# Patient Record
Sex: MALE | Race: WHITE | Employment: OTHER | URBAN - METROPOLITAN AREA
[De-identification: names, ages, dates, MRNs, and addresses within clinical notes are randomized per-mention and may not be internally consistent; named-entity substitution may affect disease eponyms.]

---

## 2017-07-31 ENCOUNTER — HOSPITAL ENCOUNTER (OUTPATIENT)
Facility: CLINIC | Age: 52
Discharge: HOME | End: 2017-07-31
Attending: SURGERY | Admitting: SURGERY
Payer: COMMERCIAL

## 2017-07-31 ENCOUNTER — SURGERY (OUTPATIENT)
Age: 52
End: 2017-07-31

## 2017-07-31 ENCOUNTER — ANESTHESIA EVENT (OUTPATIENT)
Dept: GASTROENTEROLOGY | Facility: CLINIC | Age: 52
End: 2017-07-31
Payer: COMMERCIAL

## 2017-07-31 ENCOUNTER — ANESTHESIA (OUTPATIENT)
Dept: GASTROENTEROLOGY | Facility: CLINIC | Age: 52
End: 2017-07-31
Payer: COMMERCIAL

## 2017-07-31 VITALS
DIASTOLIC BLOOD PRESSURE: 100 MMHG | BODY MASS INDEX: 30.92 KG/M2 | HEIGHT: 68 IN | SYSTOLIC BLOOD PRESSURE: 136 MMHG | TEMPERATURE: 97.4 F | HEART RATE: 80 BPM | OXYGEN SATURATION: 97 % | RESPIRATION RATE: 16 BRPM | WEIGHT: 204 LBS

## 2017-07-31 LAB — UPPER GI ENDOSCOPY: NORMAL

## 2017-07-31 PROCEDURE — 25000125 ZZHC RX 250: Performed by: NURSE ANESTHETIST, CERTIFIED REGISTERED

## 2017-07-31 PROCEDURE — 25000125 ZZHC RX 250: Performed by: SURGERY

## 2017-07-31 PROCEDURE — 37000008 ZZH ANESTHESIA TECHNICAL FEE, 1ST 30 MIN: Performed by: SURGERY

## 2017-07-31 PROCEDURE — 25000125 ZZHC RX 250

## 2017-07-31 PROCEDURE — 43239 EGD BIOPSY SINGLE/MULTIPLE: CPT | Performed by: SURGERY

## 2017-07-31 PROCEDURE — 25000128 H RX IP 250 OP 636: Performed by: SURGERY

## 2017-07-31 PROCEDURE — 88305 TISSUE EXAM BY PATHOLOGIST: CPT | Performed by: SURGERY

## 2017-07-31 PROCEDURE — 88305 TISSUE EXAM BY PATHOLOGIST: CPT | Mod: 26,59 | Performed by: SURGERY

## 2017-07-31 RX ORDER — LIDOCAINE 40 MG/G
CREAM TOPICAL
Status: DISCONTINUED | OUTPATIENT
Start: 2017-07-31 | End: 2017-07-31 | Stop reason: HOSPADM

## 2017-07-31 RX ORDER — ONDANSETRON 2 MG/ML
4 INJECTION INTRAMUSCULAR; INTRAVENOUS
Status: DISCONTINUED | OUTPATIENT
Start: 2017-07-31 | End: 2017-07-31 | Stop reason: HOSPADM

## 2017-07-31 RX ORDER — GLYCOPYRROLATE 0.2 MG/ML
INJECTION, SOLUTION INTRAMUSCULAR; INTRAVENOUS PRN
Status: DISCONTINUED | OUTPATIENT
Start: 2017-07-31 | End: 2017-07-31

## 2017-07-31 RX ORDER — PROPOFOL 10 MG/ML
INJECTION, EMULSION INTRAVENOUS PRN
Status: DISCONTINUED | OUTPATIENT
Start: 2017-07-31 | End: 2017-07-31

## 2017-07-31 RX ORDER — SODIUM CHLORIDE, SODIUM LACTATE, POTASSIUM CHLORIDE, CALCIUM CHLORIDE 600; 310; 30; 20 MG/100ML; MG/100ML; MG/100ML; MG/100ML
INJECTION, SOLUTION INTRAVENOUS CONTINUOUS
Status: DISCONTINUED | OUTPATIENT
Start: 2017-07-31 | End: 2017-07-31 | Stop reason: HOSPADM

## 2017-07-31 RX ORDER — LIDOCAINE HYDROCHLORIDE 10 MG/ML
INJECTION, SOLUTION INFILTRATION; PERINEURAL PRN
Status: DISCONTINUED | OUTPATIENT
Start: 2017-07-31 | End: 2017-07-31

## 2017-07-31 RX ORDER — PROPOFOL 10 MG/ML
INJECTION, EMULSION INTRAVENOUS CONTINUOUS PRN
Status: DISCONTINUED | OUTPATIENT
Start: 2017-07-31 | End: 2017-07-31

## 2017-07-31 RX ADMIN — GLYCOPYRROLATE 0.2 MG: 0.2 INJECTION, SOLUTION INTRAMUSCULAR; INTRAVENOUS at 12:34

## 2017-07-31 RX ADMIN — LIDOCAINE HYDROCHLORIDE 50 MG: 10 INJECTION, SOLUTION INFILTRATION; PERINEURAL at 12:34

## 2017-07-31 RX ADMIN — LIDOCAINE HYDROCHLORIDE 1 ML: 10 INJECTION, SOLUTION EPIDURAL; INFILTRATION; INTRACAUDAL; PERINEURAL at 12:16

## 2017-07-31 RX ADMIN — PROPOFOL 200 MCG/KG/MIN: 10 INJECTION, EMULSION INTRAVENOUS at 12:34

## 2017-07-31 RX ADMIN — PROPOFOL 100 MG: 10 INJECTION, EMULSION INTRAVENOUS at 12:34

## 2017-07-31 RX ADMIN — SODIUM CHLORIDE, POTASSIUM CHLORIDE, SODIUM LACTATE AND CALCIUM CHLORIDE: 600; 310; 30; 20 INJECTION, SOLUTION INTRAVENOUS at 12:16

## 2017-07-31 ASSESSMENT — LIFESTYLE VARIABLES: TOBACCO_USE: 1

## 2017-07-31 NOTE — H&P
"[facility]  GI Pre-Procedure History & Physical      Name: Darryl Guo MRN#: [id number]   Age: 52 year old YOB: 1965     Date of Procedure: 7/31/2017    Primary care provider: Lolis Reeves Stambaugh      Reason for Procedure:   Screening (V76.51), abdominal pain    Problem List:   See a copy of the patient s current problem list from [facility].  I have reviewed this document and have no additions or corrections.    Current Outpatient Medications:      No current outpatient prescriptions on file.        Allergies:    No Known Allergies     History:   See a copy of the patient s current past history from [facility].  I have reviewed this document and have no additions or corrections.    Physical Exam:   Vital Signs:  BP (!) 129/91  Pulse 76  Temp 97.4  F (36.3  C) (Oral)  Resp 18  Ht 1.727 m (5' 8\")  Wt 92.5 kg (204 lb)  SpO2 97%  BMI 31.02 kg/m2  Airway assessment:  Patient is able to open mouth wide  Patient is able to stick out tongue    ASA:  2  Mallampati Score: 2     Lungs:  No increased work of breathing, good air exchange, clear to auscultation bilaterally, no crackles or wheezing.   Cardiovascular:  Normal- regular rate and rhythm, normal S1 - S2, no S3 or S4, and no murmur noted.  Gastrointestinal:  Abdomen soft, non-tender.  BS normal. No masses, organomegaly.        Assessment:   Appropriately NPO.  No significant changes since H&P.    Plan:   Moderate (conscious) sedation     General and specific risks of the procedure of the procedure including pain, bleeding, and perforation were discussed. Possibility of missing lesions discussed. Prep and recovery were discussed. He asked appropriate questions and provided consent.      Patient's active problems diagnostically and therapeutically optimized for the planned procedure. Risks, benefits, alternatives to sedation and blood explained and consent obtained.  Risks, benefits, alternatives to procedure explained and consent " obtained.    I have reviewed the history and physical, lab finding(s), diagnostic data, medications, and the plan for sedation.  I have determined this patient to be an appropriate candidate for the planned sedation/procedure and have reassessed the patient immediately prior to sedation/procedure.    Robin Mckeon MD

## 2017-07-31 NOTE — ANESTHESIA CARE TRANSFER NOTE
Patient: Darryl Guo    Procedure(s):  Gastroscopy with biopsies - Wound Class: II-Clean Contaminated    Diagnosis: reflux  Diagnosis Additional Information: No value filed.    Anesthesia Type:   General, MAC     Note:  Airway :Nasal Cannula  Patient transferred to:Phase II        Vitals: (Last set prior to Anesthesia Care Transfer)    CRNA VITALS  7/31/2017 1223 - 7/31/2017 1300      7/31/2017             Pulse: 98    SpO2: 97 %                Electronically Signed By: MANI Orlando CRNA  July 31, 2017  1:00 PM

## 2017-07-31 NOTE — ANESTHESIA PREPROCEDURE EVALUATION
Anesthesia Evaluation     . Pt has had prior anesthetic.     No history of anesthetic complications          ROS/MED HX    ENT/Pulmonary:     (+)tobacco use, Past use , . .    Neurologic:  - neg neurologic ROS     Cardiovascular:  - neg cardiovascular ROS       METS/Exercise Tolerance:  >4 METS   Hematologic:  - neg hematologic  ROS       Musculoskeletal:  - neg musculoskeletal ROS       GI/Hepatic: Comment: Increased risk of aspiration due to GERD    (+) GERD Symptomatic, Other GI/Hepatic epigastric pain      Renal/Genitourinary:  - ROS Renal section negative       Endo:     (+) Obesity, .      Psychiatric:  - neg psychiatric ROS       Infectious Disease:  - neg infectious disease ROS       Malignancy:      - no malignancy   Other:    - neg other ROS                 Physical Exam  Normal systems: cardiovascular, pulmonary and dental    Airway   Mallampati: II  TM distance: >3 FB  Neck ROM: full    Dental     Cardiovascular       Pulmonary                     Anesthesia Plan      History & Physical Review  History and physical reviewed and following examination; no interval change.    ASA Status:  2 .    NPO Status:  > 8 hours    Plan for General and MAC with Propofol and Intravenous induction. Maintenance will be Balanced.  Reason for MAC:  Deep or markedly invasive procedure (G8)  PONV prophylaxis:  Ondansetron (or other 5HT-3) and Dexamethasone or Solumedrol       Postoperative Care  Postoperative pain management:  IV analgesics and Oral pain medications.      Consents  Anesthetic plan, risks, benefits and alternatives discussed with:  Patient..                          .

## 2017-07-31 NOTE — ANESTHESIA POSTPROCEDURE EVALUATION
Patient: Darryl Guo    Procedure(s):  Gastroscopy with biopsies - Wound Class: II-Clean Contaminated    Diagnosis:reflux  Diagnosis Additional Information: No value filed.    Anesthesia Type:  General, MAC    Note:  Anesthesia Post Evaluation    Patient location during evaluation: Bedside  Patient participation: Able to fully participate in evaluation  Post-procedure mental status: sleepy.  Pain management: adequate  Airway patency: patent  Cardiovascular status: stable  Respiratory status: nasal cannula  Hydration status: stable  PONV: none     Anesthetic complications: None          Last vitals:  Vitals:    07/31/17 1150 07/31/17 1255   BP: (!) 129/91 (!) 123/94   Pulse: 76 92   Resp: 18 16   Temp: 36.3  C (97.4  F)    SpO2: 97% 98%         Electronically Signed By: MANI Orlando CRNA  July 31, 2017  1:00 PM

## 2017-07-31 NOTE — PROGRESS NOTES
Pt.  and wife verbally stated understanding of all instructions and importance of follow up care. Pt. tolerating P.o w/o difficulty. Ambulated out to lobby w/o difficulty.

## 2017-08-02 LAB — COPATH REPORT: NORMAL

## 2017-10-16 ENCOUNTER — HOSPITAL ENCOUNTER (EMERGENCY)
Facility: CLINIC | Age: 52
Discharge: HOME | End: 2017-10-16
Attending: PHYSICIAN ASSISTANT | Admitting: PHYSICIAN ASSISTANT
Payer: COMMERCIAL

## 2017-10-16 VITALS — OXYGEN SATURATION: 99 % | HEART RATE: 92 BPM | DIASTOLIC BLOOD PRESSURE: 84 MMHG | SYSTOLIC BLOOD PRESSURE: 129 MMHG

## 2017-10-16 DIAGNOSIS — S61.009A: ICD-10-CM

## 2017-10-16 PROCEDURE — 96372 THER/PROPH/DIAG INJ SC/IM: CPT

## 2017-10-16 PROCEDURE — 90715 TDAP VACCINE 7 YRS/> IM: CPT | Performed by: PHYSICIAN ASSISTANT

## 2017-10-16 PROCEDURE — 12042 INTMD RPR N-HF/GENIT2.6-7.5: CPT | Mod: FA

## 2017-10-16 PROCEDURE — 99214 OFFICE O/P EST MOD 30 MIN: CPT | Mod: 25

## 2017-10-16 PROCEDURE — 25000128 H RX IP 250 OP 636: Performed by: PHYSICIAN ASSISTANT

## 2017-10-16 PROCEDURE — 12041 INTMD RPR N-HF/GENIT 2.5CM/<: CPT | Mod: FA | Performed by: PHYSICIAN ASSISTANT

## 2017-10-16 PROCEDURE — 99213 OFFICE O/P EST LOW 20 MIN: CPT | Mod: 25 | Performed by: PHYSICIAN ASSISTANT

## 2017-10-16 PROCEDURE — 90471 IMMUNIZATION ADMIN: CPT

## 2017-10-16 PROCEDURE — 12041 INTMD RPR N-HF/GENIT 2.5CM/<: CPT | Mod: FA

## 2017-10-16 RX ORDER — KETOROLAC TROMETHAMINE 30 MG/ML
30 INJECTION, SOLUTION INTRAMUSCULAR; INTRAVENOUS ONCE
Status: COMPLETED | OUTPATIENT
Start: 2017-10-16 | End: 2017-10-16

## 2017-10-16 RX ADMIN — CLOSTRIDIUM TETANI TOXOID ANTIGEN (FORMALDEHYDE INACTIVATED), CORYNEBACTERIUM DIPHTHERIAE TOXOID ANTIGEN (FORMALDEHYDE INACTIVATED), BORDETELLA PERTUSSIS TOXOID ANTIGEN (GLUTARALDEHYDE INACTIVATED), BORDETELLA PERTUSSIS FILAMENTOUS HEMAGGLUTININ ANTIGEN (FORMALDEHYDE INACTIVATED), BORDETELLA PERTUSSIS PERTACTIN ANTIGEN, AND BORDETELLA PERTUSSIS FIMBRIAE 2/3 ANTIGEN 0.5 ML: 5; 2; 2.5; 5; 3; 5 INJECTION, SUSPENSION INTRAMUSCULAR at 16:29

## 2017-10-16 RX ADMIN — KETOROLAC TROMETHAMINE 30 MG: 30 INJECTION, SOLUTION INTRAMUSCULAR at 16:28

## 2017-10-16 NOTE — ED AVS SNAPSHOT
[facility] Emergency Department    [address] [address]    Phone:  [phone]    Fax:  [phone]                                       Darryl Guo   MRN: [id number]    Department:  [facility] Emergency Department   Date of Visit:  10/16/2017           Patient Information     Date Of Birth          1965        Your diagnoses for this visit were:     Avulsion of skin of thumb without complication, initial encounter        You were seen by Zoey Nuñez PA-C.      Follow-up Information     Follow up with Clinic, [facility] In 2 days.    Contact information:    [facility] [facility] [id number] [phone]          Discharge Instructions       After care instructions:  Keep wound clean and dry for the next 24-48 hours  Signs of infection discussed today  May return to work as long as wound is kept clean and dry  Discussed the probability of scarring  Tylenol/ibuprofen over the counter as needed for pain.     Tetanus given in office today     Follow up with PCP or return to care in 2-3 days.    Return to clinic sooner or go to Emergency Room if symptoms worsen or change or signs of infection occur (redness, red streaking, warmth, increased pain, increased swelling, purulent drainage, or fevers occur.)    Patient voiced understanding of instructions given.            24 Hour Appointment Hotline       To make an appointment at any [facility], call [phone] ([phone]). If you don't have a family doctor or clinic, we will help you find one. [facility] clinics are conveniently located to serve the needs of you and your family.             Review of your medicines      Our records show that you are taking the medicines listed below. If these are incorrect, please call your family doctor or clinic.        Dose / Directions Last dose taken    citalopram 40 MG tablet   Commonly known as:  celeXA   Dose:  40 mg        Take 40 mg by mouth daily.  "  Refills:  0        meclizine 25 MG tablet   Commonly known as:  ANTIVERT   Dose:  25 mg   Quantity:  90 tablet        Take 1 tablet by mouth 3 times daily as needed.   Refills:  12                Orders Needing Specimen Collection     None      Pending Results     No orders found from 10/14/2017 to 10/17/2017.            Pending Culture Results     No orders found from 10/14/2017 to 10/17/2017.            Pending Results Instructions     If you had any lab results that were not finalized at the time of your Discharge, you can call the ED Lab Result RN at [phone]. You will be contacted by this team for any positive Lab results or changes in treatment. The nurses are available 7 days a week from 10A to 6:30P.  You can leave a message 24 hours per day and they will return your call.        Test Results From Your Hospital Stay               Thank you for choosing [facility]       Thank you for choosing [facility] for your care. Our goal is always to provide you with excellent care. Hearing back from our patients is one way we can continue to improve our services. Please take a few minutes to complete the written survey that you may receive in the mail after you visit with us. Thank you!        [employer]ributor Information     Differential lets you send messages to your doctor, view your test results, renew your prescriptions, schedule appointments and more. To sign up, go to [email or web address] . Click on \"Log in\" on the left side of the screen, which will take you to the Welcome page. Then click on \"Sign up Now\" on the right side of the page.     You will be asked to enter the access code listed below, as well as some personal information. Please follow the directions to create your username and password.     Your access code is: [email or web address]  Expires: 2018  4:57 PM     Your access code will  in 90 days. If you need help or a new code, please call your [facility] clinic or [phone].        Care EveryWhere ID     " This is your Care EveryWhere ID. This could be used by other organizations to access your [place] medical records  [id number]        Equal Access to Services     NICK BURRELL : Kashmir Stewart, ethan mcnally, nishi rodrigez, rita xiong. So [facility] [place] [phone].    ATENCIÓN: Si habla español, tiene a gurrola disposición servicios gratuitos de asistencia lingüística. Llame al [phone].    We comply with applicable federal civil rights laws and [place] laws. We do not discriminate on the basis of race, color, national origin, age, disability, sex, sexual orientation, or gender identity.            After Visit Summary       This is your record. Keep this with you and show to your community pharmacist(s) and doctor(s) at your next visit.

## 2017-10-16 NOTE — ED AVS SNAPSHOT
[facility] Emergency Department    [address] [address]    Phone:  [phone]    Fax:  [phone]                                       Darryl Guo   MRN: [id number]    Department:  [facility] Emergency Department   Date of Visit:  10/16/2017           After Visit Summary Signature Page     I have received my discharge instructions, and my questions have been answered. I have discussed any challenges I see with this plan with the nurse or doctor.    ..........................................................................................................................................  Patient/Patient Representative Signature      ..........................................................................................................................................  Patient Representative Print Name and Relationship to Patient    ..................................................               ................................................  Date                                            Time    ..........................................................................................................................................  Reviewed by Signature/Title    ...................................................              ..............................................  Date                                                            Time

## 2017-10-16 NOTE — DISCHARGE INSTRUCTIONS
After care instructions:  Keep wound clean and dry for the next 24-48 hours  Signs of infection discussed today  May return to work as long as wound is kept clean and dry  Discussed the probability of scarring  Tylenol/ibuprofen over the counter as needed for pain.     Tetanus given in office today     Follow up with PCP or return to care in 2-3 days.    Return to clinic sooner or go to Emergency Room if symptoms worsen or change or signs of infection occur (redness, red streaking, warmth, increased pain, increased swelling, purulent drainage, or fevers occur.)    Patient voiced understanding of instructions given.

## 2017-10-16 NOTE — ED PROVIDER NOTES
History     Chief Complaint   Patient presents with     Laceration     avulsion to lt thumb     HPI    Darryl Guo is a 52 year old male who presents to the clinic with a laceration on the left thumb sustained 1 hours ago.  This is a non-work related injury.  Mechanism of injury: patient cut thumb on a table saw today at home.   Associated symptoms: Denies numbness, weakness, or loss of function  Tetanus given in office today.   Patient denies bone injury or foreign body and declined x-ray in office today.      Problem list, Medication list, Allergies, and Medical/Social/Surgical histories reviewed in [facility] and updated as appropriate.      Review of Systems     All normal unless stated above     Physical Exam   BP: 129/84  Pulse: 92  SpO2: 99 %       Physical Exam   Constitutional: He is oriented to person, place, and time. He appears well-developed and well-nourished. No distress.   Cardiovascular: Normal rate, regular rhythm, normal heart sounds and intact distal pulses.    Pulses:       Radial pulses are 2+ on the right side, and 2+ on the left side.   Pulmonary/Chest: Effort normal and breath sounds normal.   Musculoskeletal: Normal range of motion. He exhibits tenderness (soft tissue of left thumb ). He exhibits no edema or deformity.        Left hand: He exhibits tenderness. He exhibits normal range of motion, normal two-point discrimination, normal capillary refill and no swelling. Normal sensation noted. Normal strength noted.        Hands:  Full range of motion in left thumb.    Neurological: He is alert and oriented to person, place, and time. He has normal strength. No sensory deficit. GCS eye subscore is 4. GCS verbal subscore is 5. GCS motor subscore is 6.   Skin: Skin is warm and dry. Laceration (positive skin avulsion to the pad of the left thumb 1cm x 1cm. ) noted. No burn, no ecchymosis and no rash noted. He is not diaphoretic. No erythema. No pallor.   Psychiatric: He has a normal mood and  affect. His behavior is normal. Judgment and thought content normal.              No results found for this or any previous visit (from the past 24 hour(s)).    ED Course     ED Course     Laceration repair  Date/Time: 10/16/2017 9:01 PM  Performed by: DENISHA CHAU  Authorized by: DENISHA CHAU   Consent: Verbal consent obtained.  Risks and benefits: risks, benefits and alternatives were discussed  Consent given by: patient  Patient identity confirmed: verbally with patient  Body area: upper extremity  Location details: left thumb  Wound length (cm): 1cm x1cm   Foreign bodies: no foreign bodies  Tendon involvement: none  Nerve involvement: none  Vascular damage: no  Anesthesia: local infiltration    Anesthesia:  Local Anesthetic: lidocaine 1% without epinephrine  Anesthetic total: 2 mL  Irrigation solution: saline (hibaclens)  Irrigation method: syringe  Amount of cleaning: extensive  Debridement: none  Dressing: tube gauze  Patient tolerance: Patient tolerated the procedure well with no immediate complications  Comments: Skin avulsion to the pad of the left thumb; sugicel was used to dress the thumb with tube gauze dressing. No bone exposure.                     Critical Care time:  none               Labs Ordered and Resulted from Time of ED Arrival Up to the Time of Departure from the ED - No data to display    Assessments & Plan (with Medical Decision Making)     I have reviewed the nursing notes.    I have reviewed the findings, diagnosis, plan and need for follow up with the patient.       Discharge Medication List as of 10/16/2017  4:57 PM          Final diagnoses:   Avulsion of skin of thumb without complication, initial encounter       10/16/2017   [facility] EMERGENCY DEPARTMENT     Denisha Chau PA-C  10/16/17 3061

## 2020-01-02 ENCOUNTER — HOSPITAL ENCOUNTER (EMERGENCY)
Facility: CLINIC | Age: 55
Discharge: HOME | End: 2020-01-02
Attending: NURSE PRACTITIONER
Payer: COMMERCIAL

## 2020-01-02 VITALS
HEART RATE: 111 BPM | SYSTOLIC BLOOD PRESSURE: 129 MMHG | OXYGEN SATURATION: 98 % | RESPIRATION RATE: 28 BRPM | DIASTOLIC BLOOD PRESSURE: 86 MMHG | TEMPERATURE: 101.2 F

## 2020-01-02 DIAGNOSIS — J11.1 INFLUENZA-LIKE ILLNESS: ICD-10-CM

## 2020-01-02 RX ORDER — SERTRALINE HYDROCHLORIDE 100 MG/1
100 TABLET, FILM COATED ORAL
COMMUNITY
Start: 2019-04-09

## 2020-01-02 ASSESSMENT — ENCOUNTER SYMPTOMS
RHINORRHEA: 1
COUGH: 1
VOMITING: 0
MYALGIAS: 1
SHORTNESS OF BREATH: 1
HEADACHES: 1
FATIGUE: 1
CHEST TIGHTNESS: 1
SORE THROAT: 1
CHILLS: 1
FEVER: 1
NAUSEA: 0
APPETITE CHANGE: 1

## 2020-01-02 NOTE — ED PROVIDER NOTES
History     Chief Complaint   Patient presents with     Cough     symptoms started .  SOA with exertion and while sitting     HPI  Darryl Guo is a 54 year old male who presents to urgent care for evaluation of fever, cough, congestion, body aches, and exertional shortness of breath.  Symptoms started , 4 days ago.  Cough is productive.  Patient is a non-smoker.  Denies any history of asthma or other lung problems.    Allergies:  No Known Allergies    Problem List:    Patient Active Problem List    Diagnosis Date Noted     Dizziness 2013     Priority: Medium     Chest pain 2013     Priority: Medium     Problem list name updated by automated process. Provider to review and confirm  [employer] Update utility       Anxiety state 2012     Priority: Medium     Myocarditis (H) 2011     Priority: Medium        Past Medical History:    Past Medical History:   Diagnosis Date     Anxiety disorder      Chest pain, atypical      Myocarditis (H)      Reflux gastritis        Past Surgical History:    Past Surgical History:   Procedure Laterality Date     COLONOSCOPY N/A 2014    Procedure: COLONOSCOPY;  Surgeon: Robin Mckeon MD;  Location: [facility]     ESOPHAGOSCOPY, GASTROSCOPY, DUODENOSCOPY (EGD), COMBINED N/A 2017    Procedure: COMBINED ESOPHAGOSCOPY, GASTROSCOPY, DUODENOSCOPY (EGD), BIOPSY SINGLE OR MULTIPLE;  Gastroscopy with biopsies;  Surgeon: Robin Mckeon MD;  Location: [facility]     HAND SURGERY       Right foot surgery  2012    Has 'metal' in the foot       Family History:    Family History   Problem Relation Age of Onset     C.A.D. Father        Social History:  Marital Status:   [2]  Social History     Tobacco Use     Smoking status: Former Smoker     Types: Cigarettes     Last attempt to quit: 3/19/2007     Years since quittin.8   Substance Use Topics     Alcohol use: No     Alcohol/week: 0.0 standard drinks     Drug use: No        Medications:     sertraline (ZOLOFT) 100 MG tablet  meclizine (ANTIVERT) 25 MG tablet          Review of Systems   Constitutional: Positive for appetite change, chills, fatigue and fever.   HENT: Positive for congestion, rhinorrhea and sore throat. Negative for ear pain.    Respiratory: Positive for cough, chest tightness and shortness of breath.    Cardiovascular: Negative for chest pain.   Gastrointestinal: Negative for nausea and vomiting.   Musculoskeletal: Positive for myalgias.   Neurological: Positive for headaches.       Physical Exam   BP: 129/86  Pulse: 111  Temp: 101.2  F (38.4  C)  Resp: 28  SpO2: 98 %      Physical Exam    GENERAL APPEARANCE: alert and oriented.  Ill-appearing but nontoxic.   EYES: conjunctiva clear  HENT: bilateral ear canals clear, intact, and without inflammation. Right TM normal. Left TM normal. Nose normal.  Oropharynx without ulcers, erythema or lesions  NECK: supple, nontender, no lymphadenopathy  RESP: lungs clear to auscultation - no rales, rhonchi or wheezes  CV: Tachycardia with fever and regular rhythm, normal S1 S2, no murmur noted      ED Course        Procedures               No results found for this or any previous visit (from the past 24 hour(s)).    Medications - No data to display    Assessments & Plan (with Medical Decision Making)   History and exam is consistent with influenza-like illness.  Patient is in no respiratory distress.  Tachycardia with fever.  Normotensive.  No hypoxia.  Lung sounds are CTA.  Patient is outside the treatment window for Tamiflu.  I discussed the course of illness with patient and his wife.  Symptomatic treatment and worrisome reasons recheck discussed.  I have reviewed the nursing notes.    I have reviewed the findings, diagnosis, plan and need for follow up with the patient.      New Prescriptions    No medications on file       Final diagnoses:   Influenza-like illness       1/2/2020   [facility] EMERGENCY DEPARTMENT     Marlin Schwartz  APRN CNP  01/02/20 1302

## 2020-01-02 NOTE — DISCHARGE INSTRUCTIONS
See influenza handout.  Drink plenty of fluids.  Rest.  Recheck for persistent fevers greater than 3-5 more days, or worsening as discussed.

## 2020-01-02 NOTE — ED AVS SNAPSHOT
[facility] Emergency Department  [address] [address]  Phone:  [phone]  Fax:  [phone]                                    Darryl Guo   MRN: [id number]    Department:  [facility] Emergency Department   Date of Visit:  1/2/2020           After Visit Summary Signature Page    I have received my discharge instructions, and my questions have been answered. I have discussed any challenges I see with this plan with the nurse or doctor.    ..........................................................................................................................................  Patient/Patient Representative Signature      ..........................................................................................................................................  Patient Representative Print Name and Relationship to Patient    ..................................................               ................................................  Date                                   Time    ..........................................................................................................................................  Reviewed by Signature/Title    ...................................................              ..............................................  Date                                               Time          22EPIC Rev 08/18

## 2022-02-27 ENCOUNTER — HEALTH MAINTENANCE LETTER (OUTPATIENT)
Age: 57
End: 2022-02-27

## 2022-11-19 ENCOUNTER — HEALTH MAINTENANCE LETTER (OUTPATIENT)
Age: 57
End: 2022-11-19

## 2023-04-09 ENCOUNTER — HEALTH MAINTENANCE LETTER (OUTPATIENT)
Age: 58
End: 2023-04-09

## 2024-01-02 ENCOUNTER — HOSPITAL ENCOUNTER (EMERGENCY)
Facility: CLINIC | Age: 59
Discharge: HOME | End: 2024-01-02
Attending: EMERGENCY MEDICINE | Admitting: EMERGENCY MEDICINE

## 2024-01-02 ENCOUNTER — APPOINTMENT (OUTPATIENT)
Dept: GENERAL RADIOLOGY | Facility: CLINIC | Age: 59
End: 2024-01-02
Attending: EMERGENCY MEDICINE

## 2024-01-02 VITALS
DIASTOLIC BLOOD PRESSURE: 113 MMHG | RESPIRATION RATE: 12 BRPM | SYSTOLIC BLOOD PRESSURE: 144 MMHG | HEART RATE: 70 BPM | HEIGHT: 68 IN | BODY MASS INDEX: 30.77 KG/M2 | TEMPERATURE: 97.9 F | OXYGEN SATURATION: 95 % | WEIGHT: 203 LBS

## 2024-01-02 DIAGNOSIS — R07.9 CHEST PAIN, UNSPECIFIED TYPE: ICD-10-CM

## 2024-01-02 DIAGNOSIS — I10 HYPERTENSION, UNSPECIFIED TYPE: ICD-10-CM

## 2024-01-02 LAB
ALBUMIN SERPL BCG-MCNC: 4.4 G/DL (ref 3.5–5.2)
ALP SERPL-CCNC: 106 U/L (ref 40–150)
ALT SERPL W P-5'-P-CCNC: 42 U/L (ref 0–70)
ANION GAP SERPL CALCULATED.3IONS-SCNC: 6 MMOL/L (ref 7–15)
AST SERPL W P-5'-P-CCNC: 26 U/L (ref 0–45)
BASOPHILS # BLD AUTO: 0.1 10E3/UL (ref 0–0.2)
BASOPHILS NFR BLD AUTO: 1 %
BILIRUB SERPL-MCNC: 0.3 MG/DL
BUN SERPL-MCNC: 14.5 MG/DL (ref 8–23)
CALCIUM SERPL-MCNC: 9.3 MG/DL (ref 8.8–10.2)
CHLORIDE SERPL-SCNC: 104 MMOL/L (ref 98–107)
CREAT SERPL-MCNC: 0.88 MG/DL (ref 0.67–1.17)
DEPRECATED HCO3 PLAS-SCNC: 29 MMOL/L (ref 22–29)
EGFRCR SERPLBLD CKD-EPI 2021: >90 ML/MIN/1.73M2
EOSINOPHIL # BLD AUTO: 0.1 10E3/UL (ref 0–0.7)
EOSINOPHIL NFR BLD AUTO: 1 %
ERYTHROCYTE [DISTWIDTH] IN BLOOD BY AUTOMATED COUNT: 12 % (ref 10–15)
GLUCOSE SERPL-MCNC: 110 MG/DL (ref 70–99)
HCT VFR BLD AUTO: 43.5 % (ref 40–53)
HGB BLD-MCNC: 15.2 G/DL (ref 13.3–17.7)
IMM GRANULOCYTES # BLD: 0 10E3/UL
IMM GRANULOCYTES NFR BLD: 0 %
LIPASE SERPL-CCNC: 22 U/L (ref 13–60)
LYMPHOCYTES # BLD AUTO: 1.7 10E3/UL (ref 0.8–5.3)
LYMPHOCYTES NFR BLD AUTO: 28 %
MCH RBC QN AUTO: 30.9 PG (ref 26.5–33)
MCHC RBC AUTO-ENTMCNC: 34.9 G/DL (ref 31.5–36.5)
MCV RBC AUTO: 88 FL (ref 78–100)
MONOCYTES # BLD AUTO: 0.7 10E3/UL (ref 0–1.3)
MONOCYTES NFR BLD AUTO: 11 %
NEUTROPHILS # BLD AUTO: 3.6 10E3/UL (ref 1.6–8.3)
NEUTROPHILS NFR BLD AUTO: 59 %
NRBC # BLD AUTO: 0 10E3/UL
NRBC BLD AUTO-RTO: 0 /100
NT-PROBNP SERPL-MCNC: <36 PG/ML (ref 0–900)
PLATELET # BLD AUTO: 272 10E3/UL (ref 150–450)
POTASSIUM SERPL-SCNC: 4.4 MMOL/L (ref 3.4–5.3)
PROT SERPL-MCNC: 7.1 G/DL (ref 6.4–8.3)
RBC # BLD AUTO: 4.92 10E6/UL (ref 4.4–5.9)
SODIUM SERPL-SCNC: 139 MMOL/L (ref 135–145)
TROPONIN T SERPL HS-MCNC: 6 NG/L
TROPONIN T SERPL HS-MCNC: 7 NG/L
WBC # BLD AUTO: 6.1 10E3/UL (ref 4–11)

## 2024-01-02 PROCEDURE — 71046 X-RAY EXAM CHEST 2 VIEWS: CPT

## 2024-01-02 PROCEDURE — 93005 ELECTROCARDIOGRAM TRACING: CPT | Performed by: EMERGENCY MEDICINE

## 2024-01-02 PROCEDURE — 83880 ASSAY OF NATRIURETIC PEPTIDE: CPT | Performed by: EMERGENCY MEDICINE

## 2024-01-02 PROCEDURE — 83690 ASSAY OF LIPASE: CPT | Performed by: EMERGENCY MEDICINE

## 2024-01-02 PROCEDURE — 250N000013 HC RX MED GY IP 250 OP 250 PS 637: Performed by: EMERGENCY MEDICINE

## 2024-01-02 PROCEDURE — 99285 EMERGENCY DEPT VISIT HI MDM: CPT | Mod: 25 | Performed by: EMERGENCY MEDICINE

## 2024-01-02 PROCEDURE — 36415 COLL VENOUS BLD VENIPUNCTURE: CPT | Performed by: EMERGENCY MEDICINE

## 2024-01-02 PROCEDURE — 93010 ELECTROCARDIOGRAM REPORT: CPT | Performed by: EMERGENCY MEDICINE

## 2024-01-02 PROCEDURE — 99284 EMERGENCY DEPT VISIT MOD MDM: CPT | Mod: 25 | Performed by: EMERGENCY MEDICINE

## 2024-01-02 PROCEDURE — 80053 COMPREHEN METABOLIC PANEL: CPT | Performed by: EMERGENCY MEDICINE

## 2024-01-02 PROCEDURE — 85025 COMPLETE CBC W/AUTO DIFF WBC: CPT | Performed by: EMERGENCY MEDICINE

## 2024-01-02 PROCEDURE — 84484 ASSAY OF TROPONIN QUANT: CPT | Performed by: EMERGENCY MEDICINE

## 2024-01-02 RX ORDER — AMLODIPINE BESYLATE 5 MG/1
5 TABLET ORAL DAILY
Qty: 30 TABLET | Refills: 0 | Status: SHIPPED | OUTPATIENT
Start: 2024-01-02

## 2024-01-02 RX ORDER — NITROGLYCERIN 0.4 MG/1
0.4 TABLET SUBLINGUAL EVERY 5 MIN PRN
Status: DISCONTINUED | OUTPATIENT
Start: 2024-01-02 | End: 2024-01-02 | Stop reason: HOSPADM

## 2024-01-02 RX ADMIN — NITROGLYCERIN 0.4 MG: 0.4 TABLET SUBLINGUAL at 15:35

## 2024-01-02 ASSESSMENT — ACTIVITIES OF DAILY LIVING (ADL)
ADLS_ACUITY_SCORE: 35

## 2024-01-02 NOTE — ED NOTES
"Pt states \"I have 10/10 chest pain when I initiate conversation but when I'm talking for a while pain is 3/10.\"  "

## 2024-01-02 NOTE — ED PROVIDER NOTES
History     Chief Complaint   Patient presents with    Chest Pain     HPI  Darryl Guo is a 58 year old male with past medical history significant for previous myocarditis in 2011 anxiety reflux gastritis who presents the emergency department complaining of chest pain.  Patient states symptoms been going on for the last 2 days fairly constant.  Patient states he felt a little lightheaded today and checked his blood pressure which was elevated significantly.  Due to his pain and elevated blood pressure he did come in for further evaluation and care.  Denies any recent with trauma does not have any significant shortness of breath denies any worsening of pain with exertion pain is constantly there described as centrally located.  States he had a viral illness right before [place] denies any headache or visual changes has not had any abdominal pain back pain bowel or bladder dysfunction or focal numbness weakness in extremity.  Allergies:  No Known Allergies    Problem List:    Patient Active Problem List    Diagnosis Date Noted    Dizziness 02/19/2013     Priority: Medium    Chest pain 02/19/2013     Priority: Medium     Problem list name updated by automated process. Provider to review and confirm  [employer] Update utility      Anxiety state 01/09/2012     Priority: Medium    Myocarditis (H) 01/06/2011     Priority: Medium        Past Medical History:    Past Medical History:   Diagnosis Date    Anxiety disorder     Chest pain, atypical     Myocarditis (H)     Reflux gastritis        Past Surgical History:    Past Surgical History:   Procedure Laterality Date    COLONOSCOPY N/A 12/8/2014    Procedure: COLONOSCOPY;  Surgeon: Robin Mckeon MD;  Location: [facility]    ESOPHAGOSCOPY, GASTROSCOPY, DUODENOSCOPY (EGD), COMBINED N/A 7/31/2017    Procedure: COMBINED ESOPHAGOSCOPY, GASTROSCOPY, DUODENOSCOPY (EGD), BIOPSY SINGLE OR MULTIPLE;  Gastroscopy with biopsies;  Surgeon: Robin Mckeon MD;  Location: [facility]    HAND  "SURGERY      Right foot surgery  2012    Has 'metal' in the foot       Family History:    Family History   Problem Relation Age of Onset    C.A.D. Father        Social History:  Marital Status:   [2]  Social History     Tobacco Use    Smoking status: Former     Types: Cigarettes     Quit date: 3/19/2007     Years since quittin.8   Substance Use Topics    Alcohol use: No     Alcohol/week: 0.0 standard drinks of alcohol    Drug use: No        Medications:    meclizine (ANTIVERT) 25 MG tablet  sertraline (ZOLOFT) 100 MG tablet          Review of Systems  As per HPI.  Physical Exam   BP: (!) 148/117  Pulse: 79  Temp: 97.9  F (36.6  C)  Resp: 20  Height: 172.7 cm (5' 8\")  Weight: 92.1 kg (203 lb)  SpO2: 98 %      Physical Exam  Vitals and nursing note reviewed.   Constitutional:       General: He is not in acute distress.     Appearance: He is well-developed. He is not ill-appearing, toxic-appearing or diaphoretic.   HENT:      Head: Normocephalic and atraumatic.      Nose: Nose normal.      Mouth/Throat:      Mouth: Mucous membranes are moist.      Pharynx: Oropharynx is clear.   Eyes:      Conjunctiva/sclera: Conjunctivae normal.   Neck:      Comments: There is no JVD present.  Cardiovascular:      Rate and Rhythm: Normal rate and regular rhythm.      Pulses: Normal pulses.      Heart sounds: Normal heart sounds. No murmur heard.  Pulmonary:      Effort: Pulmonary effort is normal.      Breath sounds: Normal breath sounds.      Comments: There is no erythema edema or crepitance in the anterior chest.  No significant reproducible tenderness is present.  Chest:      Chest wall: No tenderness.   Abdominal:      General: Abdomen is flat. Bowel sounds are normal. There is no distension.      Palpations: Abdomen is soft.      Tenderness: There is no abdominal tenderness. There is no right CVA tenderness or left CVA tenderness.   Musculoskeletal:         General: No swelling or tenderness. Normal range of " motion.      Cervical back: Normal range of motion and neck supple.      Right lower leg: No edema.      Left lower leg: No edema.   Skin:     General: Skin is warm and dry.      Findings: No rash.   Neurological:      General: No focal deficit present.      Mental Status: He is alert and oriented to person, place, and time.      Sensory: No sensory deficit.      Motor: No weakness.      Coordination: Coordination normal.   Psychiatric:         Mood and Affect: Mood normal.         ED Course                 Procedures              EKG Interpretation:      Interpreted by Skinny Adamson MD  Rhythm: normal sinus   Rate: Normal  Axis: Normal  Ectopy: Several PVCs  Conduction: normal  ST Segments/ T Waves: Non-specific ST-T wave changes  Q Waves: none  Comparison to prior: Unchanged from 12/9/14 except PVCs    Clinical Impression: Normal sinus rhythm with frequent PVCs and nonspecific ST-T wave changes      Critical Care time:  none               Results for orders placed or performed during the hospital encounter of 01/02/24 (from the past 24 hour(s))   CBC with platelets, differential    Narrative    The following orders were created for panel order CBC with platelets, differential.  Procedure                               Abnormality         Status                     ---------                               -----------         ------                     CBC with platelets and d...[[id number]]                      Final result                 Please view results for these tests on the individual orders.   CBC with platelets and differential   Result Value Ref Range    WBC Count 6.1 4.0 - 11.0 10e3/uL    RBC Count 4.92 4.40 - 5.90 10e6/uL    Hemoglobin 15.2 13.3 - 17.7 g/dL    Hematocrit 43.5 40.0 - 53.0 %    MCV 88 78 - 100 fL    MCH 30.9 26.5 - 33.0 pg    MCHC 34.9 31.5 - 36.5 g/dL    RDW 12.0 10.0 - 15.0 %    Platelet Count 272 150 - 450 10e3/uL    % Neutrophils 59 %    % Lymphocytes 28 %    % Monocytes 11 %     % Eosinophils 1 %    % Basophils 1 %    % Immature Granulocytes 0 %    NRBCs per 100 WBC 0 <1 /100    Absolute Neutrophils 3.6 1.6 - 8.3 10e3/uL    Absolute Lymphocytes 1.7 0.8 - 5.3 10e3/uL    Absolute Monocytes 0.7 0.0 - 1.3 10e3/uL    Absolute Eosinophils 0.1 0.0 - 0.7 10e3/uL    Absolute Basophils 0.1 0.0 - 0.2 10e3/uL    Absolute Immature Granulocytes 0.0 <=0.4 10e3/uL    Absolute NRBCs 0.0 10e3/uL       Medications   nitroGLYcerin (NITROSTAT) sublingual tablet 0.4 mg (0.4 mg Sublingual $Given 1/2/24 3990)     Results for orders placed or performed during the hospital encounter of 01/02/24   Chest XR,  PA & LAT    Narrative    CHEST TWO VIEWS  1/2/2024 4:23 PM     HISTORY:  Chest pain.    COMPARISON: 12/10/2014.      Impression    IMPRESSION: Negative chest. Lungs clear.    RASHEEDA KATE MD         SYSTEM ID:  [id number]      Assessments & Plan (with Medical Decision Making) records reviewed.  Past medical history medications and allergies reviewed.  Office visit from 10/25/2022 was reviewed.  EKG revealed normal sinus rhythm with occasional PVC no significant change from previous except PVCs.  Due to patient's chest pain he was given nitro with no significant changes pain.  He had near resolution of his pain sometime after this.  Patient's blood pressure was noted to be elevated on arrival.  No history of hypertension.  Labs were obtained.  I dependently reviewed and interpreted labs.  Patient CBC was unremarkable.  Comprehensive metabolic panel without significant abnormality.  Lipase within normal limits.  proBNP was less than 36.  Patient's initial troponin 6-second troponin 2 hours later was 7.  A chest x-ray was obtained and I independently reviewed and interpreted this and agree with radiologist findings of no acute abnormality.  Patient's pain is resolved at this time.  Pain has been going on for 2 days with normal EKG and unremarkable troponins.  Chest x-ray is unremarkable.  Patient's blood pressure  is improved somewhat but is still elevated.  I feel this could be due to  the setting.  I do not think this is ACS or other cardiac abnormality.  I would have the patient closely follow-up with his primary care this week to recheck his blood pressure and have them manage this if needed.  I am going to the patient home with a low-dose of Norvasc and if patient cannot get into primary care or other personnel and his blood pressure readings are continued to be elevated over 2 days he should begin the Norvasc and follow-up closely with primary care.  Patient feels comfortable with this plan.  He understand if chest pain returns shortness of breath weakness altered mental status or other symptoms present he will return for recheck.     I have reviewed the nursing notes.    I have reviewed the findings, diagnosis, plan and need for follow up with the patient.             Discharge Medication List as of 1/2/2024  7:17 PM        START taking these medications    Details   amLODIPine (NORVASC) 5 MG tablet Take 1 tablet (5 mg) by mouth daily, Disp-30 tablet, R-0, Local Print             Final diagnoses:   Chest pain, unspecified type   Hypertension, unspecified type       1/2/2024   [facility] EMERGENCY DEPT       Skinny Adamson MD  01/03/24 1016

## 2024-01-02 NOTE — ED TRIAGE NOTES
171 /109 , 191/167 with an arm cuff today's readings PT states he is not on blood pressure medication. PT states he is currently is having chest pain.     Writer spoke with pt about treatment and diagnostic options in ED vs. UC. Pt agreed to be evaluated in the ED for chest pain.

## 2024-01-03 NOTE — DISCHARGE INSTRUCTIONS
If symptoms worsen or new symptoms develop.  Follow-up with primary care physician next available.  Drink plenty of fluids.  Have your blood pressure rechecked over the next few days final able to get into primary care and blood pressure still elevated after several blood pressure checks please begin Norvasc and follow-up as soon as possible with a primary care physician.

## 2024-06-16 ENCOUNTER — HEALTH MAINTENANCE LETTER (OUTPATIENT)
Age: 59
End: 2024-06-16

## 2025-06-21 ENCOUNTER — HEALTH MAINTENANCE LETTER (OUTPATIENT)
Age: 60
End: 2025-06-21

## 2025-07-07 ENCOUNTER — TRANSCRIBE ORDERS (OUTPATIENT)
Dept: OTHER | Age: 60
End: 2025-07-07

## 2025-07-07 DIAGNOSIS — Z12.11 SCREENING FOR COLON CANCER: Primary | ICD-10-CM

## (undated) RX ORDER — LIDOCAINE HYDROCHLORIDE 10 MG/ML
INJECTION, SOLUTION EPIDURAL; INFILTRATION; INTRACAUDAL; PERINEURAL
Status: DISPENSED
Start: 2017-07-31

## (undated) RX ORDER — GLYCOPYRROLATE 0.2 MG/ML
INJECTION, SOLUTION INTRAMUSCULAR; INTRAVENOUS
Status: DISPENSED
Start: 2017-07-31